# Patient Record
Sex: FEMALE | Race: BLACK OR AFRICAN AMERICAN | Employment: STUDENT | URBAN - METROPOLITAN AREA
[De-identification: names, ages, dates, MRNs, and addresses within clinical notes are randomized per-mention and may not be internally consistent; named-entity substitution may affect disease eponyms.]

---
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
## 2023-02-01 ENCOUNTER — LAB ENCOUNTER (OUTPATIENT)
Dept: LAB | Age: 18
End: 2023-02-01
Attending: FAMILY MEDICINE
Payer: COMMERCIAL

## 2023-02-01 ENCOUNTER — OFFICE VISIT (OUTPATIENT)
Dept: FAMILY MEDICINE CLINIC | Facility: CLINIC | Age: 18
End: 2023-02-01

## 2023-02-01 VITALS
OXYGEN SATURATION: 98 % | DIASTOLIC BLOOD PRESSURE: 70 MMHG | WEIGHT: 133 LBS | RESPIRATION RATE: 19 BRPM | HEIGHT: 67 IN | HEART RATE: 78 BPM | SYSTOLIC BLOOD PRESSURE: 122 MMHG | BODY MASS INDEX: 20.88 KG/M2

## 2023-02-01 DIAGNOSIS — Z11.3 SCREENING EXAMINATION FOR STD (SEXUALLY TRANSMITTED DISEASE): ICD-10-CM

## 2023-02-01 DIAGNOSIS — R63.4 WEIGHT LOSS: Primary | ICD-10-CM

## 2023-02-01 DIAGNOSIS — F41.1 GAD (GENERALIZED ANXIETY DISORDER): ICD-10-CM

## 2023-02-01 DIAGNOSIS — R53.83 FATIGUE, UNSPECIFIED TYPE: ICD-10-CM

## 2023-02-01 PROCEDURE — 3078F DIAST BP <80 MM HG: CPT | Performed by: FAMILY MEDICINE

## 2023-02-01 PROCEDURE — 99214 OFFICE O/P EST MOD 30 MIN: CPT | Performed by: FAMILY MEDICINE

## 2023-02-01 PROCEDURE — 3074F SYST BP LT 130 MM HG: CPT | Performed by: FAMILY MEDICINE

## 2023-02-01 PROCEDURE — 3008F BODY MASS INDEX DOCD: CPT | Performed by: FAMILY MEDICINE

## 2023-02-01 NOTE — PATIENT INSTRUCTIONS
[facility] Clinical Services: [phone]. [facility] [phone] [email or web address]  3. [facility]        Address: [address] [place] [address] [address] [place] [address],[address], [place], [address] [place] [address]        Phone: [phone]  4. [facility]        Address: [place] [address] [address], [address]      Phone: [phone]. Jaun Rowe       [phone]      MedicationWarning.Cymbet/

## 2023-02-02 ENCOUNTER — TELEPHONE (OUTPATIENT)
Dept: FAMILY MEDICINE CLINIC | Facility: CLINIC | Age: 18
End: 2023-02-02

## 2023-02-02 LAB
CHLAMYDIA TRACHOMATIS$RNA, TMA: DETECTED
NEISSERIA GONORRHOEAE$RNA, TMA: NOT DETECTED

## 2023-02-02 RX ORDER — AZITHROMYCIN 500 MG/1
1000 TABLET, FILM COATED ORAL ONCE
Qty: 2 TABLET | Refills: 0 | Status: SHIPPED | OUTPATIENT
Start: 2023-02-02 | End: 2023-02-02

## 2023-02-02 NOTE — TELEPHONE ENCOUNTER
Azithromycin sent to pharmacy. Take 2 tablets. Call if she vomits within 30 min of taking pills. May need another dose. Partner should be treated. No intercourse for a week. No intercourse with partner until treated. Please encourage condom use at all times.

## 2023-02-03 LAB
% SATURATION: 15 % (CALC) (ref 16–45)
ABSOLUTE BASOPHILS: 32 CELLS/UL (ref 0–200)
ABSOLUTE EOSINOPHILS: 111 CELLS/UL (ref 15–500)
ABSOLUTE LYMPHOCYTES: 1517 CELLS/UL (ref 850–3900)
ABSOLUTE MONOCYTES: 577 CELLS/UL (ref 200–950)
ABSOLUTE NEUTROPHILS: 5664 CELLS/UL (ref 1500–7800)
BASOPHILS: 0.4 %
BUN: 15 MG/DL (ref 7–25)
CALCIUM: 9.6 MG/DL (ref 8.6–10.2)
CARBON DIOXIDE: 26 MMOL/L (ref 20–32)
CHLORIDE: 104 MMOL/L (ref 98–110)
CREATININE: 0.89 MG/DL (ref 0.5–0.96)
EGFR: 95 ML/MIN/1.73M2
EOSINOPHILS: 1.4 %
GLUCOSE: 75 MG/DL (ref 65–99)
HEMATOCRIT: 37.6 % (ref 35–45)
HEMOGLOBIN: 12.4 G/DL (ref 11.7–15.5)
IRON BINDING CAPACITY: 393 MCG/DL (CALC) (ref 250–450)
IRON, TOTAL: 57 MCG/DL (ref 40–190)
LYMPHOCYTES: 19.2 %
MCH: 28.8 PG (ref 27–33)
MCHC: 33 G/DL (ref 32–36)
MCV: 87.2 FL (ref 80–100)
MONOCYTES: 7.3 %
MPV: 11.4 FL (ref 7.5–12.5)
NEUTROPHILS: 71.7 %
PLATELET COUNT: 249 THOUSAND/UL (ref 140–400)
POTASSIUM: 3.8 MMOL/L (ref 3.5–5.3)
RDW: 12.5 % (ref 11–15)
RED BLOOD CELL COUNT: 4.31 MILLION/UL (ref 3.8–5.1)
SODIUM: 140 MMOL/L (ref 135–146)
TSH W/REFLEX TO FT4: 2.01 MIU/L
VITAMIN B12: 435 PG/ML (ref 200–1100)
VITAMIN D, 25-OH, TOTAL: 17 NG/ML (ref 30–100)
WHITE BLOOD CELL COUNT: 7.9 THOUSAND/UL (ref 3.8–10.8)

## 2023-02-03 NOTE — TELEPHONE ENCOUNTER
[employer] and  message sent with provider's recommendation. RN=please contact patient and start IDPH  Form. Thanks.

## 2023-02-03 NOTE — TELEPHONE ENCOUNTER
Left message for patient to call back with any questions or concerns. She has read the [employer] message sent.

## 2023-02-06 NOTE — TELEPHONE ENCOUNTER
Discussed patient's [employer] message on 2/4 with Dr. Marychuy Mon and response sent to patient. Dr. Marychuy Mon will not get involved in the patient's partner's claims.

## 2023-02-22 ENCOUNTER — OFFICE VISIT (OUTPATIENT)
Dept: FAMILY MEDICINE CLINIC | Facility: CLINIC | Age: 18
End: 2023-02-22

## 2023-02-22 VITALS
BODY MASS INDEX: 20.88 KG/M2 | HEART RATE: 67 BPM | WEIGHT: 133 LBS | RESPIRATION RATE: 18 BRPM | SYSTOLIC BLOOD PRESSURE: 120 MMHG | OXYGEN SATURATION: 98 % | HEIGHT: 67 IN | DIASTOLIC BLOOD PRESSURE: 80 MMHG

## 2023-02-22 DIAGNOSIS — Z00.00 WELL WOMAN EXAM WITHOUT GYNECOLOGICAL EXAM: Primary | ICD-10-CM

## 2023-02-22 DIAGNOSIS — Z11.3 SCREENING EXAMINATION FOR STD (SEXUALLY TRANSMITTED DISEASE): ICD-10-CM

## 2023-02-22 PROCEDURE — 99395 PREV VISIT EST AGE 18-39: CPT

## 2023-02-22 PROCEDURE — 3008F BODY MASS INDEX DOCD: CPT

## 2023-02-22 PROCEDURE — 3079F DIAST BP 80-89 MM HG: CPT

## 2023-02-22 PROCEDURE — 3074F SYST BP LT 130 MM HG: CPT

## 2023-02-22 RX ORDER — AZITHROMYCIN 500 MG/1
TABLET, FILM COATED ORAL
COMMUNITY
Start: 2023-02-02

## 2023-05-04 ENCOUNTER — OFFICE VISIT (OUTPATIENT)
Dept: DERMATOLOGY CLINIC | Facility: CLINIC | Age: 18
End: 2023-05-04

## 2023-05-04 DIAGNOSIS — L73.9 FOLLICULITIS: Primary | ICD-10-CM

## 2023-05-04 DIAGNOSIS — L70.0 ACNE VULGARIS: ICD-10-CM

## 2023-05-04 PROCEDURE — 99203 OFFICE O/P NEW LOW 30 MIN: CPT | Performed by: PHYSICIAN ASSISTANT

## 2023-05-04 RX ORDER — TRIAMCINOLONE ACETONIDE 1 MG/G
CREAM TOPICAL 2 TIMES DAILY
Qty: 453.6 G | Refills: 0 | Status: SHIPPED | OUTPATIENT
Start: 2023-05-04

## 2023-05-04 RX ORDER — DOXYCYCLINE HYCLATE 100 MG/1
100 CAPSULE ORAL 2 TIMES DAILY
Qty: 60 CAPSULE | Refills: 3 | Status: SHIPPED | OUTPATIENT
Start: 2023-05-04

## 2023-05-04 NOTE — PATIENT INSTRUCTIONS
Morning Routine:    Cleanser  Moisturizer  Sun screen  Antibiotic pill     Evening Routine:    Cleanser  Moisturizer  Retinoid --> use a pea sized dot for the entire face 2-3 times per week. Can move slowly up to every night.    Moisturizer  Antibiotic pill

## 2023-05-09 ENCOUNTER — TELEPHONE (OUTPATIENT)
Dept: DERMATOLOGY CLINIC | Facility: CLINIC | Age: 18
End: 2023-05-09

## 2023-05-21 ENCOUNTER — TELEPHONE (OUTPATIENT)
Dept: DERMATOLOGY CLINIC | Facility: CLINIC | Age: 18
End: 2023-05-21

## 2023-05-22 NOTE — TELEPHONE ENCOUNTER
Patient called regarding antibiotic reaction. States that she had nausea and vomiting after taking doxycyline on an empty stomach. Was seen in ED. States this is the first time she had this reaction. Reiterated that medication should be taken with food. Discussed if that she were again to have similar side effects she should discontinue the medication and call our office to discuss switching to a different oral vs topical antibiotic.      Jewell Parsons MD

## 2023-05-22 NOTE — TELEPHONE ENCOUNTER
Noted. Thank you. Please ensure patient has follow-up scheduled with me in beginning of June. Also check on patient to ensure that she is not having any side effects anymore.

## 2023-06-06 ENCOUNTER — OFFICE VISIT (OUTPATIENT)
Dept: DERMATOLOGY CLINIC | Facility: CLINIC | Age: 18
End: 2023-06-06

## 2023-06-06 DIAGNOSIS — L73.9 FOLLICULITIS: Primary | ICD-10-CM

## 2023-06-06 PROCEDURE — 99213 OFFICE O/P EST LOW 20 MIN: CPT | Performed by: PHYSICIAN ASSISTANT

## 2023-06-22 ENCOUNTER — OFFICE VISIT (OUTPATIENT)
Dept: DERMATOLOGY CLINIC | Facility: CLINIC | Age: 18
End: 2023-06-22

## 2023-06-22 DIAGNOSIS — L73.8 PITYROSPORUM FOLLICULITIS: Primary | ICD-10-CM

## 2023-06-22 PROCEDURE — 99213 OFFICE O/P EST LOW 20 MIN: CPT | Performed by: PHYSICIAN ASSISTANT

## 2023-06-22 RX ORDER — KETOCONAZOLE 20 MG/ML
SHAMPOO TOPICAL
Qty: 120 ML | Refills: 3 | Status: SHIPPED | OUTPATIENT
Start: 2023-06-22

## 2023-06-22 RX ORDER — FLUCONAZOLE 200 MG/1
200 TABLET ORAL WEEKLY
Qty: 2 TABLET | Refills: 0 | Status: SHIPPED | OUTPATIENT
Start: 2023-06-22

## 2023-11-20 ENCOUNTER — LAB ENCOUNTER (OUTPATIENT)
Dept: LAB | Age: 18
End: 2023-11-20
Attending: FAMILY MEDICINE
Payer: COMMERCIAL

## 2023-11-20 ENCOUNTER — OFFICE VISIT (OUTPATIENT)
Dept: FAMILY MEDICINE CLINIC | Facility: CLINIC | Age: 18
End: 2023-11-20
Payer: MEDICAID

## 2023-11-20 VITALS
BODY MASS INDEX: 20.09 KG/M2 | RESPIRATION RATE: 17 BRPM | SYSTOLIC BLOOD PRESSURE: 118 MMHG | WEIGHT: 128 LBS | OXYGEN SATURATION: 98 % | HEIGHT: 67 IN | HEART RATE: 72 BPM | DIASTOLIC BLOOD PRESSURE: 82 MMHG

## 2023-11-20 DIAGNOSIS — E55.9 VITAMIN D DEFICIENCY: ICD-10-CM

## 2023-11-20 DIAGNOSIS — R63.4 WEIGHT LOSS: ICD-10-CM

## 2023-11-20 DIAGNOSIS — Z11.3 SCREENING EXAMINATION FOR STD (SEXUALLY TRANSMITTED DISEASE): Primary | ICD-10-CM

## 2023-11-20 LAB
BASOPHILS # BLD AUTO: 0.02 X10(3) UL (ref 0–0.2)
BASOPHILS NFR BLD AUTO: 0.3 %
DEPRECATED RDW RBC AUTO: 40.2 FL (ref 35.1–46.3)
EOSINOPHIL # BLD AUTO: 0.06 X10(3) UL (ref 0–0.7)
EOSINOPHIL NFR BLD AUTO: 0.8 %
ERYTHROCYTE [DISTWIDTH] IN BLOOD BY AUTOMATED COUNT: 12.2 % (ref 11–15)
HCT VFR BLD AUTO: 40.5 %
HGB BLD-MCNC: 13.1 G/DL
IMM GRANULOCYTES # BLD AUTO: 0.02 X10(3) UL (ref 0–1)
IMM GRANULOCYTES NFR BLD: 0.3 %
IRON SATN MFR SERPL: 38 %
IRON SERPL-MCNC: 166 UG/DL
LYMPHOCYTES # BLD AUTO: 1.42 X10(3) UL (ref 1–4)
LYMPHOCYTES NFR BLD AUTO: 18.6 %
MCH RBC QN AUTO: 28.9 PG (ref 26–34)
MCHC RBC AUTO-ENTMCNC: 32.3 G/DL (ref 31–37)
MCV RBC AUTO: 89.2 FL
MONOCYTES # BLD AUTO: 0.48 X10(3) UL (ref 0.1–1)
MONOCYTES NFR BLD AUTO: 6.3 %
NEUTROPHILS # BLD AUTO: 5.64 X10 (3) UL (ref 1.5–7.7)
NEUTROPHILS # BLD AUTO: 5.64 X10(3) UL (ref 1.5–7.7)
NEUTROPHILS NFR BLD AUTO: 73.7 %
PLATELET # BLD AUTO: 246 10(3)UL (ref 150–450)
RBC # BLD AUTO: 4.54 X10(6)UL
T3FREE SERPL-MCNC: 3.02 PG/ML (ref 2.4–4.2)
T4 FREE SERPL-MCNC: 1.3 NG/DL (ref 0.8–1.7)
TIBC SERPL-MCNC: 432 UG/DL (ref 250–425)
TRANSFERRIN SERPL-MCNC: 290 MG/DL (ref 250–380)
TSI SER-ACNC: 0.38 MIU/ML (ref 0.55–4.78)
VIT D+METAB SERPL-MCNC: 25.8 NG/ML (ref 30–100)
WBC # BLD AUTO: 7.6 X10(3) UL (ref 4–11)

## 2023-11-20 PROCEDURE — 84466 ASSAY OF TRANSFERRIN: CPT | Performed by: FAMILY MEDICINE

## 2023-11-20 PROCEDURE — 87591 N.GONORRHOEAE DNA AMP PROB: CPT | Performed by: FAMILY MEDICINE

## 2023-11-20 PROCEDURE — 86780 TREPONEMA PALLIDUM: CPT | Performed by: FAMILY MEDICINE

## 2023-11-20 PROCEDURE — 85025 COMPLETE CBC W/AUTO DIFF WBC: CPT | Performed by: FAMILY MEDICINE

## 2023-11-20 PROCEDURE — 87389 HIV-1 AG W/HIV-1&-2 AB AG IA: CPT | Performed by: FAMILY MEDICINE

## 2023-11-20 PROCEDURE — 84481 FREE ASSAY (FT-3): CPT | Performed by: FAMILY MEDICINE

## 2023-11-20 PROCEDURE — 87491 CHLMYD TRACH DNA AMP PROBE: CPT | Performed by: FAMILY MEDICINE

## 2023-11-20 PROCEDURE — 84443 ASSAY THYROID STIM HORMONE: CPT | Performed by: FAMILY MEDICINE

## 2023-11-20 PROCEDURE — 82306 VITAMIN D 25 HYDROXY: CPT | Performed by: FAMILY MEDICINE

## 2023-11-20 PROCEDURE — 84439 ASSAY OF FREE THYROXINE: CPT | Performed by: FAMILY MEDICINE

## 2023-11-20 PROCEDURE — 83540 ASSAY OF IRON: CPT | Performed by: FAMILY MEDICINE

## 2023-11-20 NOTE — PROGRESS NOTES
HPI:    Erickson Sheppard is a 19year old female presents to clinic for STD screening. Asymptomatic. Has 1 new partner. Using condoms consistently. Patient's last menstrual period was 11/04/2023 (exact date). Regular cycles, no concerns per  Also, concerned about her weight. Has recently lost 5 pounds. Started eating 3 meals a day, focusing on protein. Active at work, always up on her feet. Works at a [employer]. Normal bowel movements and urination. HISTORY:  Past Medical History:   Diagnosis Date    Allergic rhinitis       Past Surgical History:   Procedure Laterality Date    MYRINGOTOMY, LASER-ASSISTED        Family History   Problem Relation Age of Onset    Breast Cancer Mother     Hypertension Mother     Diabetes Maternal Grandfather     Hypertension Maternal Grandfather       Social History:   Social History     Socioeconomic History    Marital status: Single   Tobacco Use    Smoking status: Never     Passive exposure: Never    Smokeless tobacco: Never   Substance and Sexual Activity    Alcohol use: Yes     Comment: Occ    Drug use: No   Other Topics Concern    Breast feeding No    Reaction to local anesthetic No    Pt has a pacemaker No    Pt has a defibrillator No        Medications (Active prior to today's visit):  No current outpatient medications on file. Allergies: Allergies   Allergen Reactions    Latex ITCHING and RASH         Depression Screening (PHQ-2/PHQ-9): Over the LAST 2 WEEKS                         ROS:   Review of Systems   All other systems reviewed and are negative. PHYSICAL EXAM:     Vitals:    11/20/23 1649   BP: 118/82   BP Location: Right arm   Patient Position: Sitting   Cuff Size: adult   Pulse: 72   Resp: 17   SpO2: 98%   Weight: 128 lb (58.1 kg)   Height: 5' 7\" (1.702 m)     Physical Exam  Vitals reviewed. Constitutional:       General: She is not in acute distress. Cardiovascular:      Rate and Rhythm: Normal rate.    Pulmonary:      Effort: Pulmonary effort is normal. No respiratory distress. Neurological:      Mental Status: She is alert. Mental status is at baseline. Psychiatric:         Mood and Affect: Mood normal.         ASSESSMENT/PLAN:   (Z11.3) Screening examination for STD (sexually transmitted disease)  (primary encounter diagnosis)  Plan: Chlamydia/Gc Amplification [E], HIV AG AB Combo        [E], T Pallidum Screening [place] TREP [E]  -STD screening to lab. Safe sexual practices encouraged.    (E55.9) Vitamin D deficiency  Plan: Vitamin D [E]  -Previously deficient. Taking supplements. Level rechecked today.    (R63.4) Weight loss  Plan: CBC W Differential W Platelet [E], IRON AND         TIBC [7573] [Q], TSH W Reflex To Free T4 [E]  -In addition to 3 meals a day, I suggested protein bars to supplement between meals. Labs drawn. Will continue to follow     Responsible party/patient verbalized understanding of information discussed. No barriers to learning observed. Orders This Visit:  Orders Placed This Encounter   Procedures    HIV AG AB Combo [E]    T Pallidum Screening [place] TREP [E]    Vitamin D [E]    CBC W Differential W Platelet [E]    IRON AND TIBC [7573] [Q]    TSH W Reflex To Free T4 [E]    Fluzone Quadrivalent 6mo+ 0.5mL    Chlamydia/Gc Amplification [E]       Meds This Visit:  Requested Prescriptions      No prescriptions requested or ordered in this encounter       Imaging & Referrals:  INFLUENZA VACCINE, QUAD, PRESERVATIVE FREE, 0.5 ML       The 21st Century cures Act makes medical notes like these available to patients in the interest of transparency. However, be advised that this is a medical document. It is intended as peer to peer communication. It is written in medical language and may contain abbreviations or verbiage that are unfamiliar. It may appear blunt or direct.   Medical documents are intended to carry relevant information, facts as evident, and the clinical opinion of the practitioner. This note was created by [facility] voice recognition. Errors in content may be related to improper recognition by the system; efforts to review and correct have been done but errors may still exist. Please contact me with any questions.        11/20/2023  Quincy Herrera MD

## 2023-11-21 LAB
C TRACH DNA SPEC QL NAA+PROBE: NEGATIVE
N GONORRHOEA DNA SPEC QL NAA+PROBE: NEGATIVE

## 2023-11-22 LAB — T PALLIDUM AB SER QL: NEGATIVE

## 2024-09-23 ENCOUNTER — APPOINTMENT (OUTPATIENT)
Dept: ULTRASOUND IMAGING | Age: 19
End: 2024-09-23
Attending: PHYSICIAN ASSISTANT
Payer: COMMERCIAL

## 2024-09-23 ENCOUNTER — HOSPITAL ENCOUNTER (OUTPATIENT)
Age: 19
Discharge: HOME | End: 2024-09-23
Payer: COMMERCIAL

## 2024-09-23 VITALS
TEMPERATURE: 99 F | DIASTOLIC BLOOD PRESSURE: 80 MMHG | SYSTOLIC BLOOD PRESSURE: 130 MMHG | HEART RATE: 108 BPM | OXYGEN SATURATION: 100 % | RESPIRATION RATE: 20 BRPM

## 2024-09-23 DIAGNOSIS — Z11.3 SCREENING EXAMINATION FOR STD (SEXUALLY TRANSMITTED DISEASE): ICD-10-CM

## 2024-09-23 DIAGNOSIS — N83.292 COMPLEX CYST OF LEFT OVARY: ICD-10-CM

## 2024-09-23 DIAGNOSIS — N83.209 HEMORRHAGIC CYST OF OVARY: Primary | ICD-10-CM

## 2024-09-23 DIAGNOSIS — R10.2 PAIN, PELVIC, FEMALE: ICD-10-CM

## 2024-09-23 LAB
B-HCG UR QL: NEGATIVE
CLARITY UR: CLEAR
COLOR UR: YELLOW
GLUCOSE UR STRIP-MCNC: NEGATIVE MG/DL
HGB UR QL STRIP: NEGATIVE
KETONES UR STRIP-MCNC: >=160 MG/DL
LEUKOCYTE ESTERASE UR QL STRIP: NEGATIVE
NITRITE UR QL STRIP: NEGATIVE
PH UR STRIP: 5.5 [PH]
SP GR UR STRIP: >=1.03
UROBILINOGEN UR STRIP-ACNC: <2 MG/DL

## 2024-09-23 PROCEDURE — 81002 URINALYSIS NONAUTO W/O SCOPE: CPT

## 2024-09-23 PROCEDURE — 87591 N.GONORRHOEAE DNA AMP PROB: CPT | Performed by: PHYSICIAN ASSISTANT

## 2024-09-23 PROCEDURE — 81025 URINE PREGNANCY TEST: CPT

## 2024-09-23 PROCEDURE — 99214 OFFICE O/P EST MOD 30 MIN: CPT

## 2024-09-23 PROCEDURE — 87491 CHLMYD TRACH DNA AMP PROBE: CPT | Performed by: PHYSICIAN ASSISTANT

## 2024-09-23 PROCEDURE — 76830 TRANSVAGINAL US NON-OB: CPT | Performed by: PHYSICIAN ASSISTANT

## 2024-09-23 PROCEDURE — 76856 US EXAM PELVIC COMPLETE: CPT | Performed by: PHYSICIAN ASSISTANT

## 2024-09-23 PROCEDURE — 93975 VASCULAR STUDY: CPT | Performed by: PHYSICIAN ASSISTANT

## 2024-09-23 PROCEDURE — 81514 NFCT DS BV&VAGINITIS DNA ALG: CPT | Performed by: PHYSICIAN ASSISTANT

## 2024-09-23 NOTE — ED INITIAL ASSESSMENT (HPI)
Pt states sept 12th she had bloody discharge, and then this week has had spotting which she has never had. Lower abdominal cramping starting yesterday. This am she had nausea with an episode of vomiting. No concern for STDs.

## 2024-09-23 NOTE — ED PROVIDER NOTES
Patient Seen in: [facility]      History     Chief Complaint   Patient presents with    Eval-G     Stated Complaint: abdominal pain    Subjective:   HPI    Patient is a 19-year-old female, presenting to immediate care for evaluation of vaginal spotting.  Onset: for one week.  Associated: lower abdominal pelvic cramping that started yesterday.  Episode of nausea with nonbilious, nonbloody emesis this morning.  Self-limited self resolved.  No current abdominal pain/cramping.  Had abnormal bloody vaginal discharge approximately 2 weeks ago self-limited self resolved.  No fevers.  No back or flank pain.  No urinary symptoms.  No dysuria or hematuria.  No concern for STIs.  Sexually active with several partners.  Has low suspicion for pregnancy.  Uses barrier contraception    Objective:   No pertinent past medical history.            No pertinent past surgical history.              No pertinent social history.            Review of Systems   Constitutional:  Negative for chills and fever.   Gastrointestinal:  Negative for abdominal pain, nausea and vomiting.   Genitourinary:  Positive for pelvic pain and vaginal bleeding. Negative for difficulty urinating, dysuria, flank pain, frequency, urgency and vaginal pain.   Allergic/Immunologic: Negative for immunocompromised state.   Neurological:  Negative for weakness.   Psychiatric/Behavioral:  Negative for confusion.        Positive for stated Chief Complaint: Eval-G    Other systems are as noted in HPI.  Constitutional and vital signs reviewed.      All other systems reviewed and negative except as noted above.    Physical Exam     ED Triage Vitals [09/23/24 1543]   /80   Pulse 108   Resp 20   Temp 98.6 °F (37 °C)   Temp src Oral   SpO2 100 %   O2 Device None (Room air)       Current Vitals:   Vital Signs  BP: 130/80  Pulse: 108  Resp: 20  Temp: 98.6 °F (37 °C)  Temp src: Oral    Oxygen Therapy  SpO2: 100 %  O2 Device: None (Room air)            Physical  Exam  Vitals and nursing note reviewed.   Constitutional:       General: She is not in acute distress.     Appearance: She is well-developed. She is not ill-appearing, toxic-appearing or diaphoretic.   HENT:      Head: Normocephalic and atraumatic.      Mouth/Throat:      Mouth: Mucous membranes are moist.   Eyes:      General: No scleral icterus.  Cardiovascular:      Rate and Rhythm: Normal rate and regular rhythm.   Pulmonary:      Effort: Pulmonary effort is normal. No respiratory distress.   Abdominal:      General: There is no distension.      Palpations: Abdomen is soft.      Tenderness: There is no abdominal tenderness. There is no guarding.   Musculoskeletal:         General: No swelling or tenderness. Normal range of motion.      Cervical back: No rigidity.   Skin:     Findings: No rash.   Neurological:      General: No focal deficit present.      Mental Status: She is alert.      Motor: No weakness.      Coordination: Coordination normal.      Gait: Gait normal.   Psychiatric:         Mood and Affect: Mood normal.         Behavior: Behavior normal.             ED Course     Labs Reviewed   [facility] POCT URINALYSIS DIPSTICK - Abnormal; Notable for the following components:       Result Value    Protein urine Trace (*)     Ketone, Urine >=160 (*)     Bilirubin, Urine Small (*)     All other components within normal limits   POCT PREGNANCY URINE - Normal   CHLAMYDIA/GONOCOCCUS, AIDA   VAGINITIS VAGINOSIS PCR PANEL     Results for orders placed or performed during the hospital encounter of 09/23/24   POCT Urinalysis Dipstick    Collection Time: 09/23/24  3:53 PM   Result Value Ref Range    Urine Color Yellow Yellow    Urine Clarity Clear Clear    Specific Gravity, Urine >=1.030 1.005 - 1.030    PH, Urine 5.5 5.0 - 8.0    Protein urine Trace (A) Negative mg/dL    Glucose, Urine Negative Negative mg/dL    Ketone, Urine >=160 (A) Negative mg/dL    Bilirubin, Urine Small (A) Negative    Blood, Urine Negative Negative     Nitrite Urine Negative Negative    Urobilinogen urine <2.0 <2.0 mg/dL    Leukocyte esterase urine Negative Negative   POCT Pregnancy, Urine    Collection Time: 09/23/24  4:01 PM   Result Value Ref Range    POCT Urine Pregnancy Negative Negative     US PELVIS EV W DOPPLER (CPT=76856/42987/10473)   Final Result   PROCEDURE: US PELVIS EV W DOPPLER (CPT=76856/39803/84669)       COMPARISON: None.       INDICATIONS: Pelvic pain and cramping       TECHNIQUE: Pelvic ultrasound using transabdominal and transvaginal    technique. Duplex/color Doppler evaluation of the ovaries for torsion.        FINDINGS:    UTERUS:   Measures 8.9 x 3.6 x 5.4 cm, volume 91 mL       ENDOMETRIUM: Endometrial thickness is 12 mm.  No fluid or mass in the    endometrial canal.     MYOMETRIUM: Normal echogenicity.  No masses.         OVARIES AND ADNEXA:        RIGHT:   Measures 4.2 x 2.3 x 3.6 cm, volume 18.0 mL.  There are at least    10 peripheral ovarian follicles.   LEFT:   Measures 5.2 x 3.7 x 6.7 cm, volume 67 mL.  There is a 4.5 x 3.2 x    4.4 cm hypoechoic lesion of the left ovary, with internal lace-like    septations and without internal vascularity.         DOPPLER:   Normal spectral analysis, arterial and venous waveforms and    color flow in both ovaries.     CUL-DE-SAC:   Normal.  No free fluid or mass.     OTHER:   Negative.  Bladder appears normal.                     =====   CONCLUSION:         4.5 cm complex cystic lesion of the left ovary, with imaging findings    suggestive of a hemorrhagic cyst.  Recommend follow-up pelvic ultrasound    in 6 to 12 months to ensure resolution.       At least 10 ovarian follicles within the right ovary.  Imaging appearance    is nonspecific.  This can be seen in the setting of polycystic ovarian    syndrome, in the appropriate clinical setting.  Correlate with other    clinical symptoms/findings.                 Dictated by (CST): Kim Yarbrough MD on 9/23/2024 at 4:40 PM        Finalized by  (CST): Kim Yarbrough MD on 9/23/2024 at 4:44 PM                     MDM       Patient is a 19-year-old female, presenting to immediate care for evaluation of intermittent vaginal spotting with pelvic pain for the last week.  Associated nausea and 1 episode of nonbilious, nonbloody emesis today.  No fevers.  No urinary symptoms.  Sexually active.  Low concern for STI or pregnancy.    Patient is well-appearing, hemodynamically stable.  Afebrile.  Benign abdominal exam.  Pelvic exam notable for scant white vaginal discharge without erythema.  No ulcerations or lesions.  No blood in vaginal vault.  No CMT adnexal tenderness    Formal ultrasound for evaluation of pelvic pain and rule out ovarian torsion.  Ultrasound negative for ovarian torsion.  There is 4.5 cm complex cystic lesion left ovary with findings suggestive of hemorrhagic cyst.  10 ovarian follicles within right ovaries.  Findings nonspecific.  May be related to possible PCOS.  Correlate symptoms.    Patient symptoms well-controlled.  Plan for outpatient gynecology for evaluation of left complex cyst and hemorrhagic cyst.  Pain management.  Discharge instructions on ovarian cyst provided.  ED return precautions.  STI and vaginitis screening pending                             Medical Decision Making      Disposition and Plan     Clinical Impression:  1. Hemorrhagic cyst of ovary    2. Pain, pelvic, female    3. Screening examination for STD (sexually transmitted disease)    4. Complex cyst of left ovary         Disposition:  Discharge  9/23/2024  5:20 pm    Follow-up:  Esvin Wang, DO  [address] [id number] [phone]    Schedule an appointment as soon as possible for a visit   Follow-up with gynecologist for evaluation/management of complex left ovarian cyst          Medications Prescribed:  Current Discharge Medication List

## 2024-09-24 LAB
BV BACTERIA DNA VAG QL NAA+PROBE: NEGATIVE
C GLABRATA DNA VAG QL NAA+PROBE: NEGATIVE
C KRUSEI DNA VAG QL NAA+PROBE: NEGATIVE
C TRACH DNA SPEC QL NAA+PROBE: NEGATIVE
CANDIDA DNA VAG QL NAA+PROBE: NEGATIVE
N GONORRHOEA DNA SPEC QL NAA+PROBE: NEGATIVE
T VAGINALIS DNA VAG QL NAA+PROBE: NEGATIVE

## 2024-10-11 ENCOUNTER — OFFICE VISIT (OUTPATIENT)
Dept: DERMATOLOGY CLINIC | Facility: CLINIC | Age: 19
End: 2024-10-11

## 2024-10-11 DIAGNOSIS — M67.40 GANGLION CYST: Primary | ICD-10-CM

## 2024-10-11 PROCEDURE — 99213 OFFICE O/P EST LOW 20 MIN: CPT | Performed by: PHYSICIAN ASSISTANT

## 2024-10-11 NOTE — PROGRESS NOTES
HPI:    Patient ID: Vivian Kiran is a 19 year old female.    Patient presents with mother for lump on her dorsal foot on the right foot. No draining or tenderness noted. No bleeding noted. No pain noted. No allergies to medications noted.         Review of Systems   Constitutional:  Negative for chills and fever.   Musculoskeletal:  Negative for arthralgias and myalgias.   Skin:  Positive for rash. Negative for color change and wound.            No current outpatient medications on file.     Allergies:Allergies[1]   LMP 08/28/2024 (Exact Date)   There is no height or weight on file to calculate BMI.  PHYSICAL EXAM:   Physical Exam  Constitutional:       General: She is not in acute distress.     Appearance: Normal appearance.   Skin:     General: Skin is warm and dry.      Findings: Rash present.      Comments: Soft mobile cyst noted on the right foot dorsally. No draining or tenderness noted. About 1 cm in size.    Neurological:      Mental Status: She is alert and oriented to person, place, and time.                ASSESSMENT/PLAN:   1. Ganglion cyst  -After discussion with patient, advised the following:  -Needs removal   -See podiatry  -Placed referral   -To call or follow-up with worsening symptoms or concerns.   -Pt was agreeable to plan and will comply with discussion above.         No orders of the defined types were placed in this encounter.      Meds This Visit:  Requested Prescriptions      No prescriptions requested or ordered in this encounter       Imaging & Referrals:  None         ID#[id number]       [1]   Allergies  Allergen Reactions    Latex ITCHING and RASH

## (undated) NOTE — LETTER
[facility] [address]  [phone]     Patient: Valeri Quevedo   YOB: 2005   Date of Visit: 10/22/2021     Dear Andrey Holland,      October 22, 2021    Tentative return date November 1, 2021 have been exposed to SARS-CoV-2. CDC recommends 14 days of quarantine after exposure based on the time it takes to develop illness if infected.  Thus, it is possible that a person known to be infected could leave isolation earlier than a person who is [place]

## (undated) NOTE — MR AVS SNAPSHOT
[facility]  [address]  [phone]               Thank you for choosing us for your health care visit with Soni Peralta MD.  We are glad to serve you and happy to provide you with this summary For medical emergencies, dial 911.             Educational Information     Healthy Active Living  An initiative of the American Academy of Pediatrics    Fact Sheet: Healthy Active Living for Families    Healthy nutrition starts as early as infancy with cirilo Healthy active children are more likely to be healthy active adults!              Visit [facility] online at  [email or web address]